# Patient Record
Sex: FEMALE | Race: WHITE | NOT HISPANIC OR LATINO | Employment: FULL TIME | URBAN - METROPOLITAN AREA
[De-identification: names, ages, dates, MRNs, and addresses within clinical notes are randomized per-mention and may not be internally consistent; named-entity substitution may affect disease eponyms.]

---

## 2018-05-03 DIAGNOSIS — M54.12 CERVICAL RADICULOPATHY: Primary | ICD-10-CM

## 2018-06-08 ENCOUNTER — CLINICAL SUPPORT (OUTPATIENT)
Dept: REHABILITATION | Facility: HOSPITAL | Age: 53
End: 2018-06-08
Attending: INTERNAL MEDICINE
Payer: MEDICAID

## 2018-06-08 DIAGNOSIS — Z74.09 DECREASED MOBILITY AND ENDURANCE: ICD-10-CM

## 2018-06-08 DIAGNOSIS — R53.1 DECREASED STRENGTH: ICD-10-CM

## 2018-06-08 PROCEDURE — 97110 THERAPEUTIC EXERCISES: CPT | Mod: PN

## 2018-06-08 PROCEDURE — 97161 PT EVAL LOW COMPLEX 20 MIN: CPT | Mod: PN

## 2018-06-08 NOTE — PLAN OF CARE
TIME RECORD    Date: 6/8/2018    Start Time:  7:15  Stop Time:  8:00    PROCEDURES:    TIMED  Procedure Min.   TE 10                     UNTIMED  Procedure Min.   IE 35         Total Timed Minutes:  10  Total Timed Units:  1 TE  Total Untimed Units:  1 LCE  Charges Billed/# of units:  1 TE + 1 LCE    [facility]    PHYSICAL THERAPY EVALUATION  Onset Date: 2016  Medical Diagnosis: M54.12 (ICD-10-CM) - Cervical radiculopathy  Treatment Diagnosis:   1. Decreased strength     2. Decreased mobility and endurance       Physician: Bryn Martinez,*  Past Medical History:   Diagnosis Date    Allergy     Anxiety     Arthritis     Asthma     Diabetes mellitus     Hyperlipidemia      Precautions: psoriatic arthritis, asthma, HTN, DMII  Prior Therapy: no PT  Medications: Brianna Koroma has a current medication list which includes the following prescription(s): fluoxetine, fluticasone, gabapentin, insulin nph, lisinopril, metformin, methotrexate, morinda citrifolia fruit, multivitamin, simvastatin, and sulfasalazine.  Nutrition:  Obese  History of Present Illness: See subjective below  Prior Level of Function: Independent  Social History: on disability; was a [employer]  Place of Residence (Steps/Adaptations): [facility] without step to enter  Functional Deficits Leading to Referral/Nature of Injury: sleep, carrying/lifting  Patient Therapy Goals: To get out of pain and sleep better and go back to swimming    Subjective     Brianna Koroma states she has been having neck pain for the last 20 years and recently became worse since the last 3 years which is also when she stopped seeing a chiropractor. Pt has psoriatic arthritis that gives her multiple joint issues. Pt c/o of neck and shoulder stiffness, neck clicking, disturbed sleep, whole B UE arm numbness with throbbing, and burning pain around the neck. Pt was swimming for exercise that she felt benefited her, but had to stop due to neck and B  shoulder pain. She also says she has tennis elbow in R arm, that started about 2 months ago after a day of gardening. Pt unable to walk anymore due to B hip and L thigh pain from also the psoriatic arthritis. Pt used to be a  of 30 years, and unable to perform daily duties because of her joint pain. Humira has helped with a lot of her pain that used to mainly affect her neck and hips the most, and now she can do her daily activities on the medication. Pt has a history of TMJ surgery (incision is in mouth), and has issues TMJ history. Pt has been getting headaches the last couple of weeks that mainly affects the sides of her head and jaw.    Pain:  Location: arms, neck  and trunk  Description: Aching, Dull and Tight  Activities Which Increase Pain: Bending, Morning, Lifting and Getting out of bed/chair  Activities Which Decrease Pain: relaxation, pain medication, ice, heating pad and rest  Pain Scale: 3/10 at best 5/10 now  10/10 at worst    Objective     Posture: forward head, anteriorly rolled shoulders  Palpation: +TTP at L cervical paraspinals, C6-T2 spinous process  Sensation: light touch intact  DTRs: NT  Range of Motion/Strength:   * = neck pain with testing  AROM: CERVICAL   Flexion 60   Extension 60   Right side bending 38   Left side bending 35   Right rotation 61    Left rotation 54     Shoulder  Right   Left  Pain/Dysfunction with Movement    AROM PROM MMT AROM PROM MMT    flexion WFL WFL 4+/5 WFL WFL 4+/5    extension WFL WFL 5/5 WFL WFL 5/5    abduction WFL WFL 4+/5 WFL WFL 4/5    adduction WFL WFL 5/5 WFL WFL 5/5    Internal rotation WFL WFL 5/5 WFL WFL 4+/5    ER at 0° abd WFL WFL 4/5 WFL WFL 4+/5      Flexibility: WNL  Gait: Without AD  Analysis: WFL  Bed Mobility:Independent  Transfers: Independent  Special Tests:   Sharp Rashmi = NT  Alar Ligament = NT  Spurling's test = negative B  Maximal Cervical Compression Test = NT  Cervical Distraction Test = negative  Deep Neck Flexor Endurance Test  (Longissimus capitis and colli strength test) = impaired neck flexor strength  Cervical Side-glides assessment = minimal opening restriction on Right C4-C5     CMS Impairment/Limitation/Restriction for FOTO Cervical Spine Survey    Therapist reviewed FOTO scores for Brianna Koroma on 6/8/2018.   FOTO documents entered into [facility] - see Media section.    Limitation Score: 54%  Category: Mobility  Predicted: 45%       TREATMENT     Time In: 7:50  Time Out: 8:00    PT Evaluation Completed? Yes  Discussed Plan of Care with patient: Yes    Brianna received 10 minutes of therapeutic exercise & instruction including:  DATE 6/8/2018   VISIT 1   POC 8/8/18    FOTO 1/5   MHP    MT    Stretches:    UT stretch    Levator scap stretch    Supine:    Chin tuck 5x   Chin tuck with iso c/s extension    DNF    Cervical rotations    Scapular retractions 5x5''   Dowel flexion    Serratus punches    Thoracic rotations    SL abd    SL flexion    SL gator        Prone:    Quadruped chin tuck    Chin tuck holds    Rows    I's/T's/Y's    6 Pack back        Standing:    Rows    Lat pull downs    Brueggers    Horizontal abd    Clocks    Wall slides 5x       CP    INITIALS TRU     Written Home Exercises Provided: Renee  Brianna demo good understanding of the education provided. Patient demo good return demo of skill of exercises.    See EMR in Patient Instructions for HEP given: Yes      Assessment       Initial Assessment (Pertinent finding, problem list and factors affecting outcome):   Pt is a 53 yo female dx with Cervical radiculopathy presenting to PT at [facility]. Pt currently presents with BUE and neck pain, decreased cervical ROM, decreased BUE and neck intrinsic muscle strength, poor posture, impaired, and functional deficits with carrying and lifting activities. Pt would benefit from skilled PT consisting of muscular skeletal stretching/strengthening, manual therapy, neuro muscular re-education, and  modalities prn to address limitations and increase functional mobility.      History  Co-morbidities and personal factors that may impact the plan of care Co-morbidities:   anxiety, psoriatic arthritis, astham, back pain, BMI over 30, depression, headaches, DMII, GI disease, sleep dysfunction      Personal Factors:   no deficits     high   Examination  Body Structures and Functions, activity limitations and participation restrictions that may impact the plan of care Body Regions:   head  neck  back  upper extremities  trunk    Body Systems:    gross symmetry  ROM  strength  gross coordinated movement  balance  gait  transfers  transitions  motor control  a    Participation Restrictions:   Carrying and lifting activities    Activity limitations:   Learning and applying knowledge  no deficits    General Tasks and Commands  no deficits    Communication  no deficits    Mobility  lifting and carrying objects    Self care  caring for body parts (brushing teeth, shaving, grooming)    Domestic Life  shopping  cooking    Interactions/Relationships  no deficits    Life Areas  no deficits    Community and Social Life  no deficits         high   Clinical Presentation stable and uncomplicated low   Decision Making/ Complexity Score: low       Rehab Potiential: excellent  Spiritual/Cultural Needs: None  Barriers to Rehab: psoriatic arthritis  GOALS: Short Term Goals = Long Term goals  1. Pt will demo good deep neck flexor strength to improve cervical lordosis and stabilization.  2. Pt will demo good sitting and standing posture due to psoriatic arthritis symptoms for improved spine health and decreased neck pain.  3. Pt to tolerate HEP to improve ROM and independence with ADL's  4. Report decreased neck pain </=2/10 to increase tolerance for ADLs and return to gardening.  5. Increase cervical AROM to WFL with min neck pain for increased functional mobility.  6. Increased strength in B shoulders/scapular stabilizers to >/= 5/5 MMT  grade to increase tolerance for ADL and work activities.  7. Pt will report at </= 45% on FOTO neck score for neck pain disability to demonstrate decrease in disability and improvement in neck pain.      Plan     Certification Period: 6/8/18 to 8/7/18  Recommended Treatment Plan: 2 times per week for 8 weeks: Cervical/Lumbar Traction, Electrical Stimulation IFC/Russian, Gait Training, Manual Therapy, Moist Heat/ Ice, Neuromuscular Re-ed, Patient Education, Self Care, Therapeutic Activites and Therapeutic Exercise  Other Recommendations: modalities prn, ASTYM prn, kinesiotape prn, Functional Dry Needling prn       Mk Mas, PT  6/8/2018      I CERTIFY THE NEED FOR THESE SERVICES FURNISHED UNDER THIS PLAN OF TREATMENT AND WHILE UNDER MY CARE    Physician's comments: ________________________________________________________________________________________________________________________________________________      Physician's Name: ___________________________________

## 2018-07-03 ENCOUNTER — CLINICAL SUPPORT (OUTPATIENT)
Dept: REHABILITATION | Facility: HOSPITAL | Age: 53
End: 2018-07-03
Attending: INTERNAL MEDICINE
Payer: MEDICAID

## 2018-07-03 DIAGNOSIS — R53.1 DECREASED STRENGTH: ICD-10-CM

## 2018-07-03 DIAGNOSIS — Z74.09 DECREASED MOBILITY AND ENDURANCE: ICD-10-CM

## 2018-07-03 PROCEDURE — 97110 THERAPEUTIC EXERCISES: CPT | Mod: PN

## 2018-07-03 NOTE — PROGRESS NOTES
DAILY TREATMENT NOTE    DATE: 7/3/2018    Start Time:  10:05  Stop Time:  11:00    PROCEDURES:    TIMED  Procedure Min.   TE 45   MT 10                 UNTIMED  Procedure Min.             Total Timed Minutes:  55  Total Timed Units:  4  Total Untimed Units:  0  Charges Billed/# of units:  3 TE + 1 MT      Progress/Current Status    Subjective:     Patient ID: Brianna Koroma is a 52 y.o. female.  Diagnosis:   1. Decreased strength     2. Decreased mobility and endurance       Pain: 0 /10  Pt reports she has not been doing much recently, but had been doing HEP and her neck has bene feeling better.    Objective:     Pt performed TE x 45 mins per log below, and received MT x 10 mins consisting of manual cervical distraction, gentle suboccipital release, and B UT stretch.    DATE 7/3/18 6/8/2018   VISIT 2/16 1   POC 8/8/18      FOTO 2/5 1/5   MHP      MT 10'     Stretches:      UT stretch MT     Levator scap stretch 2x30'' B     Supine:      Chin tuck  5x   Chin tuck with iso c/s extension 2x10  5'' holds     DNF 10x5'' w/ pillow     Cervical rotations 5'x5 B     Scapular retractions HEP 5x5''   Dowel flexion      Serratus punches 2x10 2# dowel  5' holds     Thoracic rotations      SL abd 2x15 1# B     SL flexion      SL gator 2x15 B     SL ER 2x10 1# B     Prone:      Quadruped chin tuck      Chin tuck holds      Rows      I's/T's/Y's      6 Pack back      Scapular retractions w/ head retraction      Standing:      Rows 2x15 GTC     Lat pull downs Next      Brueggers      Horizontal abd      Clocks      Wall slides 15x 5x          CP      INITIALS TRU TRU       Assessment:     Pt tolerated all TE well without any pain. Pt did demo trembling throughout most TE though able to complete all reps. Cont gentle MT due to psoriatic arthritis.     Patient Education/Response:     Cont HEP    Plans and Goals:     Cont per POC, progress per pt tolerance.    GOALS: Short Term Goals = Long Term goals  1. Pt will demo good deep  neck flexor strength to improve cervical lordosis and stabilization.  2. Pt will demo good sitting and standing posture due to psoriatic arthritis symptoms for improved spine health and decreased neck pain.  3. Pt to tolerate HEP to improve ROM and independence with ADL's  4. Report decreased neck pain </=2/10 to increase tolerance for ADLs and return to gardening.  5. Increase cervical AROM to WFL with min neck pain for increased functional mobility.  6. Increased strength in B shoulders/scapular stabilizers to >/= 5/5 MMT grade to increase tolerance for ADL and work activities.  7. Pt will report at </= 45% on FOTO neck score for neck pain disability to demonstrate decrease in disability and improvement in neck pain.

## 2018-07-10 ENCOUNTER — TELEPHONE (OUTPATIENT)
Dept: REHABILITATION | Facility: HOSPITAL | Age: 53
End: 2018-07-10

## 2018-08-16 ENCOUNTER — CLINICAL SUPPORT (OUTPATIENT)
Dept: REHABILITATION | Facility: HOSPITAL | Age: 53
End: 2018-08-16
Attending: INTERNAL MEDICINE
Payer: MEDICAID

## 2018-08-16 DIAGNOSIS — Z74.09 DECREASED MOBILITY AND ENDURANCE: ICD-10-CM

## 2018-08-16 DIAGNOSIS — R53.1 DECREASED STRENGTH: ICD-10-CM

## 2018-08-16 PROCEDURE — 97110 THERAPEUTIC EXERCISES: CPT | Mod: PN

## 2018-08-16 NOTE — PROGRESS NOTES
"DAILY TREATMENT NOTE    DATE: 8/16/2018    Start Time:  800  Stop Time:  845    PROCEDURES:    TIMED  Procedure Min.   Manual therapy 10   Therex 25   Therex supervised 10       Total Timed Minutes:  35  Total Timed Units:  2  Total Untimed Units:  0  Charges Billed/# of units:  2 TE      Progress/Current Status    Subjective:     Patient ID: Brianna Koroma is a 52 y.o. female.  Diagnosis:   1. Decreased strength     2. Decreased mobility and endurance         "Its bothering me a little," states doing HEP    Objective:     Manual therapy provided x 10 including STM with elongation to B Cervical paraspinals, gentle sub occipital release, manual stretch to B UT with STM/MFR to same, B upper thoracic release.  Pt performed TE x 25 mins with 1:1 by PTA, additional 10 minutes with supervision all as per log below.    DATE 8/16/18 7/3/18 6/8/2018   VISIT 3/16 2/16 1   POC 8/8/18       FOTO 3/5 2/5 1/5   MHP       MT 12' 10'     Stretches:       UT stretch MT/self next MT     Levator scap stretch MT/self 30"x2 B 2x30'' B     Supine:       Chin tuck w/ext  5x   Chin tuck with iso c/s extension 2x10  5'' holds 2x10  5'' holds     DNF 5"x10 w/pillow 10x5'' w/ pillow     Cervical rotations 5"x5  5'x5 B     Scapular retractions HEP HEP 5x5''   Dowel flexion       Serratus punches 2# dowel  5" 2x10  2x10 2# dowel  5' holds     Thoracic rotations       SL abd 2x15 1# B 2x15 1# B     SL flexion       SL gator 2x15 B 2x15 B     SL ER 1# 2x15 B 2x10 1# B     Prone:       Quadruped chin tuck       Chin tuck holds       Rows       I's/T's/Y's       6 Pack back       Scapular retractions w/ head retraction       Standing:       Rows 2x15 GTC 2x15 GTC     Lat pull downs 2x15 GTC Next      Brueggers       Horizontal abd       Clocks       Wall slides 2x10 15x 5x           CP       INITIALS DH 1/6 TRU TRU       Assessment:     Moderate myofascial restrictions noted with MT to left cerv paraspinals and UT, able to tolerate same and " "all therex with reports of "I feel good" after treatment.    Patient Education/Response:     Patient educated to continue HEP.  Verbalized understanding.    Plans and Goals:     Cont per POC, progress per pt tolerance.    GOALS: Short Term Goals = Long Term goals  1. Pt will demo good deep neck flexor strength to improve cervical lordosis and stabilization.  2. Pt will demo good sitting and standing posture due to psoriatic arthritis symptoms for improved spine health and decreased neck pain.  3. Pt to tolerate HEP to improve ROM and independence with ADL's  4. Report decreased neck pain </=2/10 to increase tolerance for ADLs and return to gardening.  5. Increase cervical AROM to WFL with min neck pain for increased functional mobility.  6. Increased strength in B shoulders/scapular stabilizers to >/= 5/5 MMT grade to increase tolerance for ADL and work activities.  7. Pt will report at </= 45% on FOTO neck score for neck pain disability to demonstrate decrease in disability and improvement in neck pain.     "

## 2018-08-21 ENCOUNTER — CLINICAL SUPPORT (OUTPATIENT)
Dept: REHABILITATION | Facility: HOSPITAL | Age: 53
End: 2018-08-21
Attending: INTERNAL MEDICINE
Payer: MEDICAID

## 2018-08-21 DIAGNOSIS — R53.1 DECREASED STRENGTH: ICD-10-CM

## 2018-08-21 DIAGNOSIS — Z74.09 DECREASED MOBILITY AND ENDURANCE: ICD-10-CM

## 2018-08-21 PROCEDURE — 97110 THERAPEUTIC EXERCISES: CPT | Mod: PN

## 2018-08-21 NOTE — PROGRESS NOTES
"DAILY TREATMENT NOTE    DATE: 8/21/2018    Start Time:  805  Stop Time:  900    PROCEDURES:    TIMED  Procedure Min.   Manual therapy 15   Therex 40   Therex supervised        Total Timed Minutes:  55  Total Timed Units:  4  Total Untimed Units:  0  Charges Billed/# of units:  4 TE      Progress/Current Status    Subjective:     Patient ID: Brianna Koroma is a 52 y.o. female.  Diagnosis:   1. Decreased strength     2. Decreased mobility and endurance       Pt reports she started taking muscle relaxers about 10 days ago that has helped with the BUE numbness at night and L sided neck pain. Pt reports she was on vacation the past couple of weeks and has been doing HEP throughout. She states this is the best she felt in years since she started taking the muscle relaxers.    Objective:   O: cervical PROM = decreased L cervical sidebending, neck pain midway throughout R sidebending, slightly limited R cervical rotation, and decreased extension  Manual therapy provided x 15 mins consisting of SBO (2 bouts), intermittent upper and mid cervical traction manually (2 bouts), cervical sideglides grade III, and cervical PROM. Pt then perform TE x 40 mins per log below.  FOTO done today due to length of time away from therapy and UPOC.    DATE 8/21/18 8/16/18 7/3/18 6/8/2018   VISIT 4/16 3/16 2/16 1   POC 8/8/18        FOTO 4/5 3/5 2/5 1/5   MHP        MT 15' 12' 10'     Stretches:        UT stretch MT MT/self next MT     Levator scap stretch NT MT/self 30"x2 B 2x30'' B     Supine:        Chin tuck -- w/ext  5x   Chin tuck with iso c/s extension 2x10 5'' holds 2x10  5'' holds 2x10  5'' holds     DNF 10x10''  No pillow 5"x10 w/pillow 10x5'' w/ pillow     Cervical rotations NT 5"x5  5'x5 B     Scapular retractions -- HEP HEP 5x5''   Dowel flexion        Serratus punches 3# dowel  5'' 2x10  2# dowel  5" 2x10  2x10 2# dowel  5' holds     Thoracic rotations        SL abd 2x15 1# B 2x15 1# B 2x15 1# B     SL flexion        SL " gator 2x10 B 1# 2x15 B 2x15 B     SL ER NT 1# 2x15 B 2x10 1# B     Prone:        Quadruped chin tuck        Chin tuck holds        Rows Next        I's/T's/Y's Next        6 Pack back        Scapular retractions w/ head retraction        Standing:        Rows NT 2x15 GTC 2x15 GTC     Lat pull downs 2x15 GTC 2x15 GTC Next      Brueggers Next        Horizontal abd 2x10 supine  RTB  next on wall       Clocks        Wall slides 2x10 w/ shrug 2x10 15x 5x            CP        INITIALS TRU DH 1/6 TRU TRU     Range of Motion/Strength:   * = neck pain with testing  AROM: CERVICAL   Flexion 65   Extension 51   Right side bending 40   Left side bending 35   Right rotation 56    Left rotation 45      Shoulder   Right     Left   Pain/Dysfunction with Movement     AROM PROM MMT AROM PROM MMT     flexion WFL WFL 4+/5 WFL WFL 4+/5     extension WFL WFL 5/5 WFL WFL 5/5     abduction WFL WFL 4/5 WFL WFL 4/5     adduction WFL WFL 5/5 WFL WFL 5/5     Internal rotation WFL WFL 5/5 WFL WFL 5/5     ER at 0° abd WFL WFL 4+/5 WFL WFL 4+/5     Cervical Side-glides assessment = minimal opening restriction on Right C4-C5     Assessment:     Pt reports no headaches or pain after the session. Pt wondering if she still needs to come in since muscle relaxers are helping, pt educated that goal is to get same releif without taking regualr medication and fixed original issue. Pt completed FOTO today.  Pt requested updated HEP next visit.    Patient Education/Response:     Patient educated to continue HEP.  Verbalized understanding.    Plans and Goals:     Cont per POC, progress per pt tolerance.    GOALS: Short Term Goals = Long Term goals  1. Pt will demo good deep neck flexor strength to improve cervical lordosis and stabilization.  2. Pt will demo good sitting and standing posture due to psoriatic arthritis symptoms for improved spine health and decreased neck pain.  3. Pt to tolerate HEP to improve ROM and independence with ADL's  4. Report  decreased neck pain </=2/10 to increase tolerance for ADLs and return to gardening.  5. Increase cervical AROM to WFL with min neck pain for increased functional mobility.  6. Increased strength in B shoulders/scapular stabilizers to >/= 5/5 MMT grade to increase tolerance for ADL and work activities.  7. Pt will report at </= 45% on FOTO neck score for neck pain disability to demonstrate decrease in disability and improvement in neck pain.

## 2018-08-21 NOTE — PLAN OF CARE
Date: 8/21/2018      PHYSICAL THERAPY UPDATED PLAN OF TREATMENT    Patient name: Brianna Koroma  Onset Date:  2016  SOC Date:  6/8/18  Primary Diagnosis: M54.12 (ICD-10-CM) - Cervical radiculopathy  Treatment Diagnosis:    1. Decreased strength     2. Decreased mobility and endurance       Referring Physician: Bryn Martinez,*  Certification Period:  8/21/18 to 10/21/18  Precautions:  Standard  Visits from SOC:  4  Functional Level Prior to SOC:     Range of Motion/Strength:   * = neck pain with testing  AROM: CERVICAL   Flexion 60   Extension 60   Right side bending 38   Left side bending 35   Right rotation 61    Left rotation 54      Shoulder   Right     Left   Pain/Dysfunction with Movement     AROM PROM MMT AROM PROM MMT     flexion WFL WFL 4+/5 WFL WFL 4+/5     extension WFL WFL 5/5 WFL WFL 5/5     abduction WFL WFL 4+/5 WFL WFL 4/5     adduction WFL WFL 5/5 WFL WFL 5/5     Internal rotation WFL WFL 5/5 WFL WFL 4+/5     ER at 0° abd WFL WFL 4/5 WFL WFL 4+/5     Cervical Side-glides assessment = minimal opening restriction on Right C4-C5   Updated Subjective: Pt reports she started taking muscle relaxers about 10 days ago that has helped with the BUE numbness at night and L sided neck pain. Pt reports she was on vacation the past couple of weeks and has been doing HEP throughout. She states this is the best she felt in years since she started taking the muscle relaxers.    Updated Objective:   Range of Motion/Strength:   * = neck pain with testing  AROM: CERVICAL   Flexion 65   Extension 51   Right side bending 40   Left side bending 35   Right rotation 56    Left rotation 45      Shoulder   Right     Left   Pain/Dysfunction with Movement     AROM PROM MMT AROM PROM MMT     flexion WFL WFL 4+/5 WFL WFL 4+/5     extension WFL WFL 5/5 WFL WFL 5/5     abduction WFL WFL 4/5 WFL WFL 4/5     adduction WFL WFL 5/5 WFL WFL 5/5     Internal rotation WFL WFL 5/5 WFL WFL 5/5     ER at 0° abd WFL WFL 4+/5 WFL  WFL 4+/5     Cervical Side-glides assessment = minimal opening restriction on Right C4-C5     Updated Assessment:  Pt tolerated today's session well with full relief of headache and neck pain. Pt is still limited in cervicla mobility and neck/UE strength and would benefit from skilled PT to improved pain, headaches, and decrease medication.  Previous Short/Long Term Goals Status:  GOALS: Short Term Goals = Long Term goals  1. Pt will demo good deep neck flexor strength to improve cervical lordosis and stabilization.  2. Pt will demo good sitting and standing posture due to psoriatic arthritis symptoms for improved spine health and decreased neck pain.  3. Pt to tolerate HEP to improve ROM and independence with ADL's  4. Report decreased neck pain </=2/10 to increase tolerance for ADLs and return to gardening.  5. Increase cervical AROM to WFL with min neck pain for increased functional mobility.  6. Increased strength in B shoulders/scapular stabilizers to >/= 5/5 MMT grade to increase tolerance for ADL and work activities.  7. Pt will report at </= 45% on FOTO neck score for neck pain disability to demonstrate decrease in disability and improvement in neck pain.    Long Term Goal Status:   continue per initial plan of care.  Reasons for Recertification of Therapy:   [facility]    Certification Period: 8/21/18 to 10/21/18  Recommended Treatment Plan: 2 times per week for 8 weeks: Aquatic Therapy, Cervical/Lumbar Traction, Electrical Stimulation IFC/Russian, Gait Training, Manual Therapy, Moist Heat/ Ice, Neuromuscular Re-ed, Orthotic Management and Training, Patient Education, Self Care, Therapeutic Activites and Therapeutic Exercise  Other Recommendations: JANETH Mas, PT  8/21/2018      I CERTIFY THE NEED FOR THESE SERVICES FURNISHED UNDER THIS PLAN OF TREATMENT AND WHILE UNDER MY CARE    Physician's comments:  ________________________________________________________________________________________________________________________________________________      Physician's Name: ___________________________________

## 2018-08-23 ENCOUNTER — CLINICAL SUPPORT (OUTPATIENT)
Dept: REHABILITATION | Facility: HOSPITAL | Age: 53
End: 2018-08-23
Attending: INTERNAL MEDICINE
Payer: MEDICAID

## 2018-08-23 DIAGNOSIS — Z74.09 DECREASED MOBILITY AND ENDURANCE: ICD-10-CM

## 2018-08-23 DIAGNOSIS — R53.1 DECREASED STRENGTH: ICD-10-CM

## 2018-08-23 PROCEDURE — 97110 THERAPEUTIC EXERCISES: CPT | Mod: PN

## 2018-08-23 NOTE — PROGRESS NOTES
"DAILY TREATMENT NOTE    DATE: 8/23/2018    Start Time:  815  Stop Time:  900    PROCEDURES:    TIMED  Procedure Min.   Therex 25   Therex supervised 20           Total Timed Minutes:  25  Total Timed Units:  2  Total Untimed Units:  0  Charges Billed/# of units:  2 TE      Progress/Current Status    Subjective:     Patient ID: Brianna Koroma is a 52 y.o. female.  Diagnosis:   1. Decreased strength     2. Decreased mobility and endurance       Patient states no pain today.    Objective:   [facility] arrived  then perform TE x  mins per log below.      DATE 8/23/18 8/21/18 8/16/18 7/3/18 6/8/2018   VISIT 5/16 4/16 3/16 2/16 1   POC 8/8/18         FOTO 5/5 4/5 DONE 3/5 2/5 1/5   MHP         MT NT 15' 12' 10'     Stretches:         UT stretch  MT MT/self next MT     Levator scap stretch  NT MT/self 30"x2 B 2x30'' B     Supine:         Chin tuck  -- w/ext  5x   Chin tuck with iso c/s extension 5" hold  2x10 2x10 5'' holds 2x10  5'' holds 2x10  5'' holds     DNF 10"x10  No pillow 10x10''  No pillow 5"x10 w/pillow 10x5'' w/ pillow     Cervical rotations  NT 5"x5  5'x5 B     Scapular retractions  -- HEP HEP 5x5''   Dowel flexion         Serratus punches 3# dowel  5" 2x12 3# dowel  5'' 2x10  2# dowel  5" 2x10  2x10 2# dowel  5' holds     Thoracic rotations         SL abd 2# 2x10 B 2x15 1# B 2x15 1# B 2x15 1# B     SL flexion         SL gator 1$ 2x15 B 2x10 B 1# 2x15 B 2x15 B     SL ER 2# 2x10 B NT 1# 2x15 B 2x10 1# B     Prone:         Quadruped chin tuck         Chin tuck holds         Rows 2x10 B Next        I's/T's/Y's Next Next        6 Pack back         Scapular retractions w/ head retraction         Standing:         Rows 2x15 GTB NT 2x15 GTC 2x15 GTC     Lat pull downs 2x15 GTB 2x15 GTC 2x15 GTC Next      Brueggers RTB 2x10  Against wall Next        Horizontal abd RTB stand   2x10  Against wall 2x10 supine  RTB  next on wall       Clocks         Wall slides 2x10 w/shrug 2x10 w/ shrug 2x10 15x 5x             CP       "   INITIALS  1/6 [facility] 1/6 [facility]       Assessment:     Patient able to increase activity despite time limitations. Manual therapy held due to pain free and time constraints.    Patient Education/Response:     Patient educated to continue HEP.  Verbalized understanding.    Plans and Goals:     Cont per POC, progress per pt tolerance.    GOALS: Short Term Goals = Long Term goals  1. Pt will demo good deep neck flexor strength to improve cervical lordosis and stabilization.  2. Pt will demo good sitting and standing posture due to psoriatic arthritis symptoms for improved spine health and decreased neck pain.  3. Pt to tolerate HEP to improve ROM and independence with ADL's  4. Report decreased neck pain </=2/10 to increase tolerance for ADLs and return to gardening.  5. Increase cervical AROM to WFL with min neck pain for increased functional mobility.  6. Increased strength in B shoulders/scapular stabilizers to >/= 5/5 MMT grade to increase tolerance for ADL and work activities.  7. Pt will report at </= 45% on FOTO neck score for neck pain disability to

## 2018-08-28 ENCOUNTER — CLINICAL SUPPORT (OUTPATIENT)
Dept: REHABILITATION | Facility: HOSPITAL | Age: 53
End: 2018-08-28
Attending: INTERNAL MEDICINE
Payer: MEDICAID

## 2018-08-28 DIAGNOSIS — Z74.09 DECREASED MOBILITY AND ENDURANCE: ICD-10-CM

## 2018-08-28 DIAGNOSIS — R53.1 DECREASED STRENGTH: ICD-10-CM

## 2018-08-28 PROCEDURE — 97110 THERAPEUTIC EXERCISES: CPT | Mod: PN

## 2018-08-28 NOTE — PROGRESS NOTES
"DAILY TREATMENT NOTE    DATE: 8/28/2018    Start Time:  8:05 AM  Stop Time: 8:50 AM    PROCEDURES:    TIMED  Procedure Min.   Therex 45               Total Timed Minutes:  45  Total Timed Units:  3  Total Untimed Units:  0  Charges Billed/# of units:  3TE      Progress/Current Status    Subjective:     Patient ID: Brianna Koroma is a 52 y.o. female.  Diagnosis:   1. Decreased strength     2. Decreased mobility and endurance       Patient states she has noticed a "lump" in her Left lateral cervical / neck. She states, " It feels tight". Pt agreeable to PT session.     Objective:   Patient completed all therex as per logged below 1:1 /w PTA     DATE 8/28/18 8/23/18 8/21/18 8/16/18 7/3/18 6/8/2018   VISIT 6/16 5/16 4/16 3/16 2/16 1   POC 8/8/18          FOTO 6/10 5/5 4/5 DONE 3/5 2/5 1/5   MHP          MT NT NT 15' 12' 10'     Stretches:          UT stretch   MT MT/self next MT     Levator scap stretch   NT MT/self 30"x2 B 2x30'' B     Supine:          Chin tuck   -- w/ext  5x   Chin tuck with iso c/s extension 5" hold x 20 5" hold  2x10 2x10 5'' holds 2x10  5'' holds 2x10  5'' holds     DNF 10"x10  10"x10  No pillow 10x10''  No pillow 5"x10 w/pillow 10x5'' w/ pillow     Cervical rotations   NT 5"x5  5'x5 B     Scapular retractions   -- HEP HEP 5x5''   Dowel flexion          Serratus punches 3# dowel  5" 2x12 3# dowel  5" 2x12 3# dowel  5'' 2x10  2# dowel  5" 2x10  2x10 2# dowel  5' holds     Thoracic rotations          SL abd 2# 2x10 B 2# 2x10 B 2x15 1# B 2x15 1# B 2x15 1# B     SL flexion          SL gator 2x10 B 1# 1$ 2x15 B 2x10 B 1# 2x15 B 2x15 B     SL ER 2# 2x10 B 2# 2x10 B NT 1# 2x15 B 2x10 1# B     Prone:          Quadruped chin tuck          Chin tuck holds          Rows 2x10 B 2x10 B Next        I's/T's/Y's 2x10 B Next Next        6 Pack back          Scapular retractions w/ head retraction          Standing:          Rows 2x15 GTB 2x15 GTB NT 2x15 GTC 2x15 GTC     Lat pull downs 2x15 GTB 2x15 GTB 2x15 " GTC 2x15 GTC Next      Brueggers RTB 2x10  Against wall RTB 2x10  Against wall Next        Horizontal abd NT RTB stand   2x10  Against wall 2x10 supine  RTB  next on wall       Clocks          Wall slides 2x10 w/shrug 2x10 w/shrug 2x10 w/ shrug 2x10 15x 5x              CP          INITIALS JA 2/6  1/6 [facility] 1/6 [facility]       Assessment:     Pt able to complete today's session with no reports of increased pain in cervical spine. Performed additional prone therex with no adverse reactions.     Patient Education/Response:     Patient educated to continue HEP.  Verbalized understanding.    Plans and Goals:     Cont per POC, progress per pt tolerance.    GOALS: Short Term Goals = Long Term goals  1. Pt will demo good deep neck flexor strength to improve cervical lordosis and stabilization.  2. Pt will demo good sitting and standing posture due to psoriatic arthritis symptoms for improved spine health and decreased neck pain.  3. Pt to tolerate HEP to improve ROM and independence with ADL's  4. Report decreased neck pain </=2/10 to increase tolerance for ADLs and return to gardening.  5. Increase cervical AROM to WFL with min neck pain for increased functional mobility.  6. Increased strength in B shoulders/scapular stabilizers to >/= 5/5 MMT grade to increase tolerance for ADL and work activities.  7. Pt will report at </= 45% on FOTO neck score for neck pain disability to

## 2018-08-30 ENCOUNTER — CLINICAL SUPPORT (OUTPATIENT)
Dept: REHABILITATION | Facility: HOSPITAL | Age: 53
End: 2018-08-30
Attending: INTERNAL MEDICINE
Payer: MEDICAID

## 2018-08-30 DIAGNOSIS — Z74.09 DECREASED MOBILITY AND ENDURANCE: ICD-10-CM

## 2018-08-30 DIAGNOSIS — R53.1 DECREASED STRENGTH: ICD-10-CM

## 2018-08-30 PROCEDURE — 97110 THERAPEUTIC EXERCISES: CPT | Mod: PN

## 2018-08-30 NOTE — PROGRESS NOTES
"  Physical Therapy Daily Treatment Note     Name: Brianna Koroma  Clinic Number: [id number]    Therapy Diagnosis:   Encounter Diagnoses   Name Primary?    Decreased strength     Decreased mobility and endurance      Physician: Bryn Martinez,*    Visit Date: 8/30/2018  Physician Orders: PT Eval and Treat  Medical Diagnosis: Radiculopathy, cervical region  Evaluation Date: 6/8/18  Authorization Period Expiration: 10/16/2018  Plan of Care Certification Period: 8/21/18 to 10/21/18  Visit #/Visits authorized: 5/ 16     Time In: 0804  Time Out: 0848  Total Billable Time: 44 minutes (TE-3)    Precautions: psoriatic arthritis, asthma, HTN, DMII    Subjective     Pt reports: no new complaints.  Response to previous treatment: no adverse reactions  Functional change: no change    Pain: 3/10  Location: bilateral neck      Objective     Brianna received therapeutic exercises to develop strength, endurance, ROM, flexibility and posture for 44 minutes including:  Log below    DATE 8/30/18 8/28/18   VISIT 7/16 6/16   POC 8/8/18     FOTO 7/10 6/10   MHP     MT -- NT   Stretches:     UT stretch --    Levator scap stretch --    Supine:     Chin tuck --    Chin tuck with iso c/s extension 5" hold x 20 5" hold x 20   DNF 10"x10  10"x10    Cervical rotations     Scapular retractions     Dowel flexion     Serratus punches 3# dowel  5" 2x15 3# dowel  5" 2x12   Thoracic rotations     SL abd 2# 3x10 B 2# 2x10 B   SL flexion     SL gator 1# 3x10 B 2x10 B 1#   SL ER 2# 3x10B 2# 2x10 B   Prone:     Quadruped chin tuck     Chin tuck holds     Rows 2x12 B 2x10 B   I's/T's/Y's 2x12 B 2x10 B   6 Pack back     Scapular retractions w/ head retraction     Standing:     Rows 3x10 BTC 2x15 GTB   Lat pull downs 3x10 BTC 2x15 GTB   Brueggers RTB 3x10  Against wall RTB 2x10  Against wall   Horizontal abd RTB 3x10  Against wall NT   Clocks     Wall slides 3x10 w/shrug 2x10 w/shrug         CP     INITIALS KV JA 2/6           Home Exercises " Provided and Patient Education Provided     Written Home Exercises Provided: Patient instructed to cont prior HEP.  Exercises were reviewed and Brianna was able to demonstrate them prior to the end of the session.  Brianna demonstrated good  understanding of the education provided.     See EMR under Patient Instructions for exercises provided prior visit.    Assessment     Pt tolerated progression of exercises well with no c/o pain or discomfort.    Brianna is progressing well towards her goals.   Pt prognosis is Good.     Pt will continue to benefit from skilled outpatient physical therapy to address the deficits listed in the problem list box on initial evaluation, provide pt/family education and to maximize pt's level of independence in the home and community environment.     Pt's spiritual, cultural and educational needs considered and pt agreeable to plan of care and goals.    Anticipated barriers to physical therapy: none    Goals:     Short Term Goals = Long Term goals  1. Pt will demo good deep neck flexor strength to improve cervical lordosis and stabilization.  2. Pt will demo good sitting and standing posture due to psoriatic arthritis symptoms for improved spine health and decreased neck pain.  3. Pt to tolerate HEP to improve ROM and independence with ADL's  4. Report decreased neck pain </=2/10 to increase tolerance for ADLs and return to gardening.  5. Increase cervical AROM to WFL with min neck pain for increased functional mobility.  6. Increased strength in B shoulders/scapular stabilizers to >/= 5/5 MMT grade to increase tolerance for ADL and work activities.  7. Pt will report at </= 45% on FOTO neck score for neck pain disability to         Plan     Cont POC.    Leslee Glass, PT

## 2018-09-04 ENCOUNTER — CLINICAL SUPPORT (OUTPATIENT)
Dept: REHABILITATION | Facility: HOSPITAL | Age: 53
End: 2018-09-04
Attending: INTERNAL MEDICINE
Payer: MEDICAID

## 2018-09-04 DIAGNOSIS — Z74.09 DECREASED MOBILITY AND ENDURANCE: ICD-10-CM

## 2018-09-04 DIAGNOSIS — R53.1 DECREASED STRENGTH: ICD-10-CM

## 2018-09-04 PROCEDURE — 97110 THERAPEUTIC EXERCISES: CPT | Mod: PN

## 2018-09-04 NOTE — PROGRESS NOTES
"    DAILY TREATMENT NOTE    DATE: 9/4/2018    Start Time:  800  Stop Time:  855    PROCEDURES:    TIMED  Procedure Min.   Therex 55               Total Timed Minutes:  55  Total Timed Units:  4  Total Untimed Units:  0  Charges Billed/# of units:  4 TE      Progress/Current Status    Subjective:     Patient ID: Brianna Koroma is a 52 y.o. female.  Diagnosis:   1. Decreased strength     2. Decreased mobility and endurance       Patient voiced no complaints.  States "ok".    Objective:     Brianna received therapeutic exercises to develop strength, endurance, ROM, flexibility and posture for 55 minutes with 1:1 by PTA including all therex as per log.  Increased reps and added thoracic rotations.    DATE 9/04/18 8/30/18 8/28/18   VISIT 8/16 7/16 6/16   POC 8/8/18      FOTO 8/10 7/10 6/10   MHP      MT -- -- NT   Stretches:      UT stretch -- --    Levator scap stretch -- --    Supine:      Chin tuck  --    Chin tuck with iso c/s extension 5"x20 5" hold x 20 5" hold x 20   DNF 10"x10 10"x10  10"x10    Cervical rotations --     Scapular retractions --     Dowel flexion      Serratus punches 3# dowel  5" 2x15 3# dowel  5" 2x15 3# dowel  5" 2x12   Thoracic rotations 2x10 B     SL abd 2# 2x15 B 2# 3x10 B 2# 2x10 B   SL flexion      SL gator 1# 2x15 B 1# 3x10 B 2x10 B 1#   SL ER 2# 3x10 B 2# 3x10B 2# 2x10 B   Prone:      Quadruped chin tuck --     Chin tuck holds --     Rows 2x15 B 2x12 B 2x10 B   I's/T's/Y's 2x12 B over GSB at mat 2x12 B 2x10 B   6 Pack back      Scapular retractions w/ head retraction      Standing:      Rows BTC 2x15 3x10 BTC 2x15 GTB   Lat pull downs BTC 2x15 3x10 BTC 2x15 GTB   Brueggers RTB 2x15  Against wall RTB 3x10  Against wall RTB 2x10  Against wall   Horizontal abd RTB 2x15  Against wall RTB 3x10  Against wall NT   Clocks      Wall slides 3x10 w/shrug 3x10 w/shrug 2x10 w/shrug          CP      INITIALS DH 1/6 KV JA 2/6       Assessment:     Patient able to increase activity with added reps and " "trial thoracic rotations, "open book" today.  States "good" after treatment.    Patient Education/Response:     Patient educated to continue HEP.  Verbalized understanding.    Plans and Goals:     Continue PT per POC, progress as able.    Short Term Goals = Long Term goals  1. Pt will demo good deep neck flexor strength to improve cervical lordosis and stabilization.  2. Pt will demo good sitting and standing posture due to psoriatic arthritis symptoms for improved spine health and decreased neck pain.  3. Pt to tolerate HEP to improve ROM and independence with ADL's  4. Report decreased neck pain </=2/10 to increase tolerance for ADLs and return to gardening.  5. Increase cervical AROM to WFL with min neck pain for increased functional mobility.  6. Increased strength in B shoulders/scapular stabilizers to >/= 5/5 MMT grade to increase tolerance for ADL and work activities.  7. Pt will report at </= 45% on FOTO neck score for neck pain disability to     "

## 2018-09-06 ENCOUNTER — CLINICAL SUPPORT (OUTPATIENT)
Dept: REHABILITATION | Facility: HOSPITAL | Age: 53
End: 2018-09-06
Attending: INTERNAL MEDICINE
Payer: MEDICAID

## 2018-09-06 DIAGNOSIS — Z74.09 DECREASED MOBILITY AND ENDURANCE: ICD-10-CM

## 2018-09-06 DIAGNOSIS — R53.1 DECREASED STRENGTH: ICD-10-CM

## 2018-09-06 PROCEDURE — 97110 THERAPEUTIC EXERCISES: CPT | Mod: PN

## 2018-09-06 NOTE — PROGRESS NOTES
"DAILY TREATMENT NOTE    DATE: 9/6/2018    Start Time:  805  Stop Time:  900    PROCEDURES:    TIMED  Procedure Min.   Therex supervised 10   Therex 40            Total Timed Minutes:  40   Total Timed Units:  3  Total Untimed Units:  0  Charges Billed/# of units:  3 TE      Progress/Current Status    Subjective:     Patient ID: Brianna Koroma is a 52 y.o. female.  Diagnosis:   1. Decreased strength     2. Decreased mobility and endurance       Patient states having less pain overall. Requesting written HEP "so I don't forget what to do."    Objective:     Brianna received therapeutic exercises to develop strength, endurance, ROM, flexibility and posture for 55 minutes with 1:1 by PTA including all therex as per log.  Increased reps and added thoracic rotations.    DATE 9/06/18 9/04/18 8/30/18 8/28/18   VISIT 9/16 8/16 7/16 6/16   POC 8/8/18       FOTO 9/10 NEXT 8/10 7/10 6/10   MHP --      MT -- -- -- NT   Stretches:       UT stretch -- -- --    Levator scap stretch -- -- --    Supine:       Chin tuck --  --    Chin tuck with iso c/s extension 5"x20 5"x20 5" hold x 20 5" hold x 20   DNF 10"x10 10"x10 10"x10  10"x10    Cervical rotations  --     Scapular retractions  --     Dowel flexion       Serratus punches 3# dowel  5" 2x15 3# dowel  5" 2x15 3# dowel  5" 2x15 3# dowel  5" 2x12   Thoracic rotations 2x12 B 2x10 B     SL abd 2# 2x15 B 2# 2x15 B 2# 3x10 B 2# 2x10 B   SL flexion       SL gator 2# 2x10 B 1# 2x15 B 1# 3x10 B 2x10 B 1#   SL ER 2# 2x15 B 2# 3x10 B 2# 3x10B 2# 2x10 B   Prone:       Quadruped chin tuck  --     Chin tuck holds  --     Rows 2x15 B 2x15 B 2x12 B 2x10 B   I's/T's/Y's 2x15 B over GSB at mat 2x12 B over GSB at mat 2x12 B 2x10 B   6 Pack back       Scapular retractions w/ head retraction       Standing:       Rows BTC 2x15 BTC 2x15 3x10 BTC 2x15 GTB   Lat pull downs BTC 2x15 BTC 2x15 3x10 BTC 2x15 GTB   Brueggers RTB 2x15  Against wall RTB 2x15  Against wall RTB 3x10  Against wall RTB " 2x10  Against wall   Horizontal abd RTB 2x15  Against wall RTB 2x15  Against wall RTB 3x10  Against wall NT   Clocks       Wall slides 2x15 w/shrug 3x10 w/shrug 3x10 w/shrug 2x10 w/shrug           CP       INITIALS DH 2/6 DH 1/6 KV JA 2/6       Assessment:     Patient able to again increase activity as noted with added reps and weight without complaint of pain or difficulty.      Patient Education/Response:     Patient educated to continue HEP.  Verbalized understanding.  Given written handout for HEP including chin tucks with extension, DNF, Serratus punch all in supine; Sidelying shldr abd, ER, horizontal abd (gator) and thoracic rotations (open book.)  Demonstrated understanding.  See copy in media.     Plans and Goals:     Continue PT per POC, progress as able.    Short Term Goals = Long Term goals  1. Pt will demo good deep neck flexor strength to improve cervical lordosis and stabilization.  2. Pt will demo good sitting and standing posture due to psoriatic arthritis symptoms for improved spine health and decreased neck pain.  3. Pt to tolerate HEP to improve ROM and independence with ADL's  4. Report decreased neck pain </=2/10 to increase tolerance for ADLs and return to gardening.  5. Increase cervical AROM to WFL with min neck pain for increased functional mobility.  6. Increased strength in B shoulders/scapular stabilizers to >/= 5/5 MMT grade to increase tolerance for ADL and work activities.  7. Pt will report at </= 45% on FOTO neck score for neck pain disability to

## 2018-09-11 ENCOUNTER — CLINICAL SUPPORT (OUTPATIENT)
Dept: REHABILITATION | Facility: HOSPITAL | Age: 53
End: 2018-09-11
Attending: INTERNAL MEDICINE
Payer: MEDICAID

## 2018-09-11 DIAGNOSIS — R53.1 DECREASED STRENGTH: ICD-10-CM

## 2018-09-11 DIAGNOSIS — Z74.09 DECREASED MOBILITY AND ENDURANCE: ICD-10-CM

## 2018-09-11 PROCEDURE — 97110 THERAPEUTIC EXERCISES: CPT | Mod: PN

## 2018-09-13 ENCOUNTER — CLINICAL SUPPORT (OUTPATIENT)
Dept: REHABILITATION | Facility: HOSPITAL | Age: 53
End: 2018-09-13
Attending: INTERNAL MEDICINE
Payer: MEDICAID

## 2018-09-13 DIAGNOSIS — Z74.09 DECREASED MOBILITY AND ENDURANCE: ICD-10-CM

## 2018-09-13 DIAGNOSIS — R53.1 DECREASED STRENGTH: ICD-10-CM

## 2018-09-13 PROCEDURE — 97110 THERAPEUTIC EXERCISES: CPT | Mod: PN

## 2018-09-13 NOTE — PROGRESS NOTES
"DAILY TREATMENT NOTE    DATE: 9/13/2018    Start Time:  800  Stop Time:  900    PROCEDURES:    TIMED  Procedure Min.   Therex 45   Manual therapy 10         Total Timed Minutes:  55  Total Timed Units:  4  Total Untimed Units:  0  Charges Billed/# of units:  4 TE      Progress/Current Status    Subjective:     Patient ID: Brianna Koroma is a 52 y.o. female.  Diagnosis:   1. Decreased strength     2. Decreased mobility and endurance       Pain: 5 /10  "My arms went numb again Tuesday night, and last night too but not as bad."  She thinks it was stretch (UT) with her arm behind her back that caused it.  " I felt a pop when I was doing it"    Objective:     melanie received therapeutic exercises to develop strength, endurance, ROM, flexibility and posture for 45 minutes with 1:1 by PTA including all therex as per log.  Added trial I,T, Y's with 1# weight today.  Manual therapy provided x 10 minutes including STM with elongation to B Cervical paraspinals, manual stretch to B UT with STM/MFR to same, STM/DTM/TPR to R UT and B upper thoracic release.      DATE 9/13/18 9/11/18 9/06/18 9/04/18 8/30/18 8/28/18   VISIT 11/16 10/16 9/16 8/16 7/16 6/16   POC 8/8/18         FOTO At dc 10/10 DONE 9/10 NEXT 8/10 7/10 6/10   MHP   --      MT   -- -- -- NT   Stretches:         UT stretch 30"x3 2x30'' B -- -- --    Levator scap stretch   -- -- --    Supine:         Chin tuck   --  --    Chin tuck with iso c/s extension 5"x20 5''x20 5"x20 5"x20 5" hold x 20 5" hold x 20   DNF 10"x10 20x10''  Back down to 10x next time 10"x10 10"x10 10"x10  10"x10    Cervical rotations    --     Scapular retractions    --     Dowel flexion         Serratus punches 5# dowel  20x5'' 5# dowel  20x5'' 3# dowel  5" 2x15 3# dowel  5" 2x15 3# dowel  5" 2x15 3# dowel  5" 2x12   Thoracic rotations   2x12 B 2x10 B     SL abd NT NT 2# 2x15 B 2# 2x15 B 2# 3x10 B 2# 2x10 B   SL flexion         SL gator NT NT 2# 2x10 B 1# 2x15 B 1# 3x10 B 2x10 B 1#   SL ER 3# 2x10 " "B 3# 2x10 2# 2x15 B 2# 3x10 B 2# 3x10B 2# 2x10 B   Prone:         Quadruped chin tuck    --     Chin tuck holds    --     Rows 2# 2x15 B 2x15 B 2# 2x15 B 2x15 B 2x12 B 2x10 B   I's/T's/Y's 1# 2x10 B over blue SB 2x15 B over blue SB 2x15 B over GSB at mat 2x12 B over GSB at mat 2x12 B 2x10 B   6 Pack back         Scapular retractions w/ head retraction         Standing:         Rows In Prone Prone  BTC 2x15 BTC 2x15 3x10 BTC 2x15 GTB   Lat pull downs BTB 2x15 BT 3x10 BTC 2x15 BTC 2x15 3x10 BTC 2x15 GTB   Brueggers GTB 2x10 NT RTB 2x15  Against wall RTB 2x15  Against wall RTB 3x10  Against wall RTB 2x10  Against wall   Horizontal abd GTB 2x10 NT RTB 2x15  Against wall RTB 2x15  Against wall RTB 3x10  Against wall NT   Clocks         Wall slides 20x w/shrug 20x w/ shrug 2x15 w/shrug 3x10 w/shrug 3x10 w/shrug 2x10 w/shrug             CP         INITIALS DH 1/6 TRU DH 2/6 DH 1/6 KV JA 2/6       Assessment:     Patient able to perform UT stretch with hand on mat without difficulty or pain.  Significant muscle tension/trigger point noted R UT with manual therapy which decreased after interventions.  Rated pain/soreness "2/10" after treatment.    Patient Education/Response:     Patient educated to continue HEP.  Verbalized understanding.  Perform UT stretch with hand on mat instead of behind     Plans and Goals:     Continue PT per POC, progress as able.    Short Term Goals = Long Term goals  1. Pt will demo good deep neck flexor strength to improve cervical lordosis and stabilization.  2. Pt will demo good sitting and standing posture due to psoriatic arthritis symptoms for improved spine health and decreased neck pain.  3. Pt to tolerate HEP to improve ROM and independence with ADL's  4. Report decreased neck pain </=2/10 to increase tolerance for ADLs and return to gardening.  5. Increase cervical AROM to WFL with min neck pain for increased functional mobility.  6. Increased strength in B shoulders/scapular stabilizers " to >/= 5/5 MMT grade to increase tolerance for ADL and work activities.  7. Pt will report at </= 45% on FOTO neck score for neck pain disability to

## 2018-09-20 ENCOUNTER — CLINICAL SUPPORT (OUTPATIENT)
Dept: REHABILITATION | Facility: HOSPITAL | Age: 53
End: 2018-09-20
Attending: INTERNAL MEDICINE
Payer: MEDICAID

## 2018-09-20 DIAGNOSIS — R53.1 DECREASED STRENGTH: ICD-10-CM

## 2018-09-20 DIAGNOSIS — Z74.09 DECREASED MOBILITY AND ENDURANCE: ICD-10-CM

## 2018-09-20 PROCEDURE — 97110 THERAPEUTIC EXERCISES: CPT | Mod: PN

## 2018-09-20 NOTE — PROGRESS NOTES
"DAILY TREATMENT NOTE    DATE: 9/20/2018    Start Time:  800  Stop Time:  855    PROCEDURES:    TIMED  Procedure Min.   Manual therapy 12   Therex 43           Total Timed Minutes:  55  Total Timed Units:  4  Total Untimed Units:  0  Charges Billed/# of units:  4 TE      Progress/Current Status    Subjective:     Patient ID: Brianna Koroma is a 52 y.o. female.  Diagnosis:   1. Decreased strength     2. Decreased mobility and endurance       Pain: 5 /10  "I've had a flare up - I never know when its going to happen.  It woke me up last night tingle then numb/heavy/dead feeling both arms, left more."    Objective:     Manual therapy provided x 10 minutes including STM with elongation to B Cervical paraspinals, manual stretch to B UT with STM/MFR to same, STM/DTM/TPR to R UT and B upper thoracic release.  Brianna received therapeutic exercises to develop strength, endurance, ROM, flexibility and posture for 45 minutes with 1:1 by PTA including all therex as per log.  Modified reps as noted.      DATE 9/20/18 9/13/18 9/11/18 9/06/18 9/04/18 8/30/18 8/28/18   VISIT 12/16 11/16 10/16 9/16 8/16 7/16 6/16   POC 8/8/18          FOTO At d/c At [place] 10/10 DONE 9/10 NEXT 8/10 7/10 6/10   MHP    --      MT    -- -- -- NT   Stretches:          UT stretch 30"x3 B 30"x3 2x30'' B -- -- --    Levator scap stretch    -- -- --    Supine:          Chin tuck    --  --    Chin tuck with iso c/s extension 5"x20 5"x20 5''x20 5"x20 5"x20 5" hold x 20 5" hold x 20   DNF 10"x10 10"x10 20x10''  Back down to 10x next time 10"x10 10"x10 10"x10  10"x10    Cervical rotations     --     Scapular retractions     --     Dowel flexion          Serratus punches 5# dowel  5"x20 5# dowel  20x5'' 5# dowel  20x5'' 3# dowel  5" 2x15 3# dowel  5" 2x15 3# dowel  5" 2x15 3# dowel  5" 2x12   Thoracic rotations    2x12 B 2x10 B     SL abd 2# 2x10 B NT NT 2# 2x15 B 2# 2x15 B 2# 3x10 B 2# 2x10 B   SL flexion          SL gator 2# 2x10 B NT NT 2# 2x10 B 1# 2x15 B 1# " "3x10 B 2x10 B 1#   SL ER 2# 2x10 B 3# 2x10 B 3# 2x10 2# 2x15 B 2# 3x10 B 2# 3x10B 2# 2x10 B   Prone:          Quadruped chin tuck --    --     Chin tuck holds --    --     Rows 2# 2x10 B 2# 2x15 B 2x15 B 2# 2x15 B 2x15 B 2x12 B 2x10 B   I's/T's/Y's 1# 2x10 B over blue SB 1# 2x10 B over blue SB 2x15 B over blue SB 2x15 B over GSB at mat 2x12 B over GSB at mat 2x12 B 2x10 B   6 Pack back          Scapular retractions w/ head retraction          Standing:          Rows prone In Prone Prone  BTC 2x15 BTC 2x15 3x10 BTC 2x15 GTB   Lat pull downs BTV  BTB 2x15 BT 3x10 BTC 2x15 BTC 2x15 3x10 BTC 2x15 GTB   Brueggers GTB 2x10 GTB 2x10 NT RTB 2x15  Against wall RTB 2x15  Against wall RTB 3x10  Against wall RTB 2x10  Against wall   Horizontal abd GTB 2x10 GTB 2x10 NT RTB 2x15  Against wall RTB 2x15  Against wall RTB 3x10  Against wall NT   Clocks          Wall slides 2x10 w/shrug 20x w/shrug 20x w/ shrug 2x15 w/shrug 3x10 w/shrug 3x10 w/shrug 2x10 w/shrug              CP          INITIALS DH 2/6 DH 1/6 TRU DH 2/6 DH 1/6 KV JA 2/6       Assessment:     Patient able to complete modified program today with decreased reps as noted.  Monitored throughout without complaint of increased pain or difficulty.  States "I feel stiff, but no pain now." after treatment.    Patient Education/Response:     Patient educated to continue HEP.  Verbalized understanding.    Plans and Goals:     Continue PT per POC, progress as able.  Monitor response to today's treatment.    Short Term Goals = Long Term goals  1. Pt will demo good deep neck flexor strength to improve cervical lordosis and stabilization.  2. Pt will demo good sitting and standing posture due to psoriatic arthritis symptoms for improved spine health and decreased neck pain.  3. Pt to tolerate HEP to improve ROM and independence with ADL's  4. Report decreased neck pain </=2/10 to increase tolerance for ADLs and return to gardening.  5. Increase cervical AROM to WFL with min neck " pain for increased functional mobility.  6. Increased strength in B shoulders/scapular stabilizers to >/= 5/5 MMT grade to increase tolerance for ADL and work activities.  7. Pt will report at </= 45% on FOTO neck score for neck pain disability to

## 2018-11-04 ENCOUNTER — HOSPITAL ENCOUNTER (EMERGENCY)
Facility: HOSPITAL | Age: 53
Discharge: HOME | End: 2018-11-04
Attending: EMERGENCY MEDICINE
Payer: MEDICARE

## 2018-11-04 VITALS
SYSTOLIC BLOOD PRESSURE: 123 MMHG | DIASTOLIC BLOOD PRESSURE: 79 MMHG | BODY MASS INDEX: 35.85 KG/M2 | TEMPERATURE: 98 F | RESPIRATION RATE: 19 BRPM | HEIGHT: 64 IN | WEIGHT: 210 LBS | HEART RATE: 83 BPM | OXYGEN SATURATION: 98 %

## 2018-11-04 DIAGNOSIS — L40.50 PSORIATIC ARTHRITIS: Primary | ICD-10-CM

## 2018-11-04 DIAGNOSIS — H04.122 DRY EYE OF LEFT SIDE: ICD-10-CM

## 2018-11-04 PROCEDURE — 99283 EMERGENCY DEPT VISIT LOW MDM: CPT

## 2018-11-04 PROCEDURE — 63600175 PHARM REV CODE 636 W HCPCS: Performed by: EMERGENCY MEDICINE

## 2018-11-04 PROCEDURE — 25000003 PHARM REV CODE 250: Performed by: EMERGENCY MEDICINE

## 2018-11-04 RX ORDER — TETRACAINE HYDROCHLORIDE 5 MG/ML
2 SOLUTION OPHTHALMIC
Status: DISCONTINUED | OUTPATIENT
Start: 2018-11-04 | End: 2018-11-04

## 2018-11-04 RX ORDER — CYCLOBENZAPRINE HCL 5 MG
5 TABLET ORAL 3 TIMES DAILY PRN
COMMUNITY

## 2018-11-04 RX ORDER — PROPARACAINE HYDROCHLORIDE 5 MG/ML
1 SOLUTION/ DROPS OPHTHALMIC
Status: COMPLETED | OUTPATIENT
Start: 2018-11-04 | End: 2018-11-04

## 2018-11-04 RX ORDER — NAPROXEN 500 MG/1
500 TABLET ORAL 2 TIMES DAILY
COMMUNITY

## 2018-11-04 RX ORDER — OMEPRAZOLE 40 MG/1
40 CAPSULE, DELAYED RELEASE ORAL DAILY
COMMUNITY

## 2018-11-04 RX ADMIN — PROPARACAINE HYDROCHLORIDE 1 DROP: 5 SOLUTION/ DROPS OPHTHALMIC at 11:11

## 2018-11-04 RX ADMIN — FLUORESCEIN SODIUM AND BENOXINATE HYDROCHLORIDE 1 DROP: 4; 2.5 SOLUTION OPHTHALMIC at 11:11

## 2018-11-04 NOTE — ED PROVIDER NOTES
CHIEF COMPLAINT  Chief Complaint   Patient presents with    Eye Pain     pt presents to ED today c/o left eye pain onset this am denies changes in vision        HPI  Brianna Koroma is a 53 y.o. female who presents with left eye pain after waking up this morning around 0730. She describes her pain as a stinging pain initially which developed into a pressure towards the rear of her eye. She also states that she is having some memory loss today, stating she is having trouble remembering her medical history. She reports subjective fever last night with myalgias. Patient was seen at an Urgent care for this issue today, and was referred to the ED for further evaluation. She reports no relief with use of an ice pack, and has not taken any medication for her symptoms. Patient denies any change or loss in vision, rash, dysuria, chills. She has no prior history of similar eye issues. Patient states she has no history of shingles, but has not been vaccinated for shingles. She does have a history of psoriatic arthritis, and is currently taking Humira for this.     This is the extent of the patient's complaints at this time.       PAST MEDICAL HISTORY  Past Medical History:   Diagnosis Date    Allergy     Anxiety     Arthritis     Asthma     Diabetes mellitus     Hyperlipidemia        CURRENT MEDICATIONS    No current facility-administered medications for this encounter.     Current Outpatient Medications:     adalimumab (HUMIRA) 40 mg/0.4 mL SyKt, Inject 40 mg into the skin every 14 (fourteen) days., Disp: , Rfl:     cyclobenzaprine (FLEXERIL) 5 MG tablet, Take 5 mg by mouth 3 (three) times daily as needed for Muscle spasms., Disp: , Rfl:     fluoxetine (PROZAC) 40 MG capsule, Take 40 mg by mouth every evening., Disp: , Rfl:     fluticasone (FLONASE) 50 mcg/actuation nasal spray, 1 spray by Each Nare route once daily., Disp: , Rfl:     GABAPENTIN ORAL, Take by mouth., Disp: , Rfl:     insulin NPH (NOVOLIN N)  100 unit/mL injection, Inject into the skin 2 (two) times daily before meals. 29 units in am, and 19 units in pm, Disp: , Rfl:     LISINOPRIL ORAL, Take by mouth once daily., Disp: , Rfl:     metformin (GLUCOPHAGE) 500 MG tablet, Take 500 mg by mouth 2 (two) times daily with meals., Disp: , Rfl:     naproxen (NAPROSYN) 500 MG tablet, Take 500 mg by mouth 2 (two) times daily., Disp: , Rfl:     omeprazole (PRILOSEC) 40 MG capsule, Take 40 mg by mouth once daily., Disp: , Rfl:     simvastatin (ZOCOR) 20 MG tablet, Take 20 mg by mouth every evening., Disp: , Rfl:     SULFASALAZINE ORAL, Take by mouth 2 (two) times daily., Disp: , Rfl:     multivitamin Liqd, Take by mouth., Disp: , Rfl:     peg 400-propylene glycol, PF, (SYSTANE ULTRA, PF,) 0.4-0.3 % Dpet, Apply 2 drops to eye 3 (three) times daily. for 7 days, Disp: 1 each, Rfl: 0    ALLERGIES    Review of patient's allergies indicates:   Allergen Reactions    Pcn [penicillins] Hives       SURGICAL HISTORY    Past Surgical History:   Procedure Laterality Date    CHOLECYSTECTOMY      MOUTH SURGERY      jaw sx    TONSILLECTOMY         SOCIAL HISTORY    Social History     Socioeconomic History    Marital status:      Spouse name: None    Number of children: None    Years of education: None    Highest education level: None   Social Needs    Financial resource strain: None    Food insecurity - worry: None    Food insecurity - inability: None    Transportation needs - medical: None    Transportation needs - non-medical: None   Occupational History    None   Tobacco Use    Smoking status: Former Smoker    Smokeless tobacco: Never Used   Substance and Sexual Activity    Alcohol use: Yes     Comment: socially    Drug use: None    Sexual activity: None   Other Topics Concern    None   Social History Narrative    None       FAMILY HISTORY    Family History   Problem Relation Age of Onset    Heart disease Mother     Diabetes Mellitus Mother   "   Lupus Mother     Rheum arthritis Mother     Hypertension Mother     Thyroid disease Mother     Diabetes Mellitus Father     Heart disease Father     Hypertension Father     Kidney disease Father        REVIEW OF SYSTEMS    Eyes: Left eye pain  All Systems otherwise negative except as noted in the Review of Systems and History of Present Illness.    PHYSICAL EXAM    VITAL SIGNS: /79   Pulse 83   Temp 98 °F (36.7 °C) (Oral)   Resp 19   Ht 5' 4" (1.626 m)   Wt 95.3 kg (210 lb)   SpO2 98%   BMI 36.05 kg/m²    Constitutional:  In mild distress.  Well developed, well nourished, alert & oriented x 3, non-toxic appearance.   HENT:  Normocephalic, atraumatic.  Normal ears, nose, and throat.  Eyes: vision 20/25 bilaterally. No conjunctival injection. No corneal abrasion. No evidence of cell & flare, does not have photophobia, symptomatic improvement after tetracaine administered, Eye pressure 7, 11, 14.   Neck: Normal range of motion  Respiratory: Unlabored breathing  Cardiovascular:  Regular rate  GI:  Soft, nontender, no rebound or guarding.  Musculoskeletal: No bony deformities with normal range of motion  Integument:  Warm, dry skin without infection or injury.  Neurologic:  Normal motor, sensation with no focal deficit.  Psychiatric:  Mood and affect normal. No SI, HI and not gravely disabled.      LABS  Pertinent labs reviewed. (See chart for details)   Labs Reviewed - No data to display      RADIOLOGY    No orders to display         PROCEDURES    Procedures    Medications   proparacaine 0.5 % ophthalmic solution 1 drop (1 drop Left Eye Given 11/4/18 1115)   fluorescein-benoxinate 0.25-0.4% ophthalmic solution 1 drop (1 drop Left Eye Given 11/4/18 1115)       ED COURSE & MEDICAL DECISION MAKING      Pertinent & Imaging studies reviewed. (See chart for details)    Differential Diagnosis: Uveitis, glaucoma, less likely retinal detachment, unlikely hyphema, unlikely open globe, unlikely " conjunctivitis, unlikely foreign body    ED Course as of Nov 04 1325   Sun Nov 04, 2018   1115 Awaiting medications  [MG]   1147 Awaiting callback from [facility]. Pressures normal, no corneal abrasion, improvement after proparacaine, no obvious cell or flare on slit lamp exam. Vision 20/25 both eyes  [MG]   1154 Spoke with Dr Kothari, will put on lubricant drop, and will have her call [phone]  [MG]      ED Course User Index  [MG] Junie Jane MD         This SmartLink is deprecated. Use AVSMEDLIST instead to display the medication list for a patient.    This SmartLink is deprecated. Use AVSMEDLIST instead to display the medication list for a patient.      OVERALL IMPRESSION:     This is a 53-year-old female, with a history of psoriatic arthritis, presents with left eye pain. Physical exam is notable for no vision loss, visual acuity of 20/25 bilaterally, no conjunctival injection or edema, normal eye pressures, no evidence of a corneal abrasion, she did have complete resolution of symptoms after proparacaine.  She has no photophobia, consensual or otherwise.  Physical exam without obvious cell or flare.  She has been complaining of dry eyes recently.  In conversation, Dr. Alas, on-call ophthalmologist, on the [facility] Call line, patient should start on a lubricant drops, if she is not improving, she can call to make an appointment, or see her own ophthalmologist.      FINAL DIAGNOSIS  1. Psoriatic arthritis    2. Dry eye of left side        DISPOSITION  Patient discharged in stable condition.    I discussed with patient and/or family/caretaker that this evaluation in the ED does not suggest any emergent or life threatening condition medical condition requiring admission or immediate intervention beyond what was provided in the ED.  Regardless, an unremarkable evaluation in the ED does not preclude the development or presence of a serious or life threatening condition. As such, patient was instructed to return  immediately for any worsening or change in current symptoms.         Please pardon typos or dictation errors, as this note was transcribed using [employer] direct dictation software.      I have reviewed the note created by Raoul bella  and agree with their documentation of this patient.      Junie Jane MD  Emergency Medicine  [facility]  11/4/2018 10:56 AM     uJnie Jane MD  11/04/18 2320

## 2018-11-04 NOTE — DISCHARGE INSTRUCTIONS
PLEASE PLACE THE EYE DROPS ON YOUR EYE THREE TIMES DAILY  PLEASE CALL THE EYE DOCTOR, [phone] TO BE SEEN  IF YOU ARE NOT HAVING SIGNIFICANT IMPROVEMENT  RETURN TO THE ED FOR ANY WORSENING SYMPTOMS

## 2018-11-05 NOTE — ED PROVIDER NOTES
Encounter Date: 11/4/2018     SCRIBE #1 NOTE: I, Mario Lloyd, am scribing for, and in the presence of,  Dr. Jane. I have scribed the entire note.         History     Chief Complaint   Patient presents with    Eye Pain     pt presents to ED today c/o left eye pain onset this am denies changes in vision      HPI  Review of patient's allergies indicates:   Allergen Reactions    Pcn [penicillins] Hives     Past Medical History:   Diagnosis Date    Allergy     Anxiety     Arthritis     Asthma     Diabetes mellitus     Hyperlipidemia      Past Surgical History:   Procedure Laterality Date    CHOLECYSTECTOMY      MOUTH SURGERY      jaw sx    TONSILLECTOMY       Family History   Problem Relation Age of Onset    Heart disease Mother     Diabetes Mellitus Mother     Lupus Mother     Rheum arthritis Mother     Hypertension Mother     Thyroid disease Mother     Diabetes Mellitus Father     Heart disease Father     Hypertension Father     Kidney disease Father      Social History     Tobacco Use    Smoking status: Former Smoker    Smokeless tobacco: Never Used   Substance Use Topics    Alcohol use: Yes     Comment: socially    Drug use: Not on file     Review of Systems    Physical Exam     Initial Vitals [11/04/18 1036]   BP Pulse Resp Temp SpO2   123/79 83 19 98 °F (36.7 °C) 98 %      MAP       --         Physical Exam    ED Course   Procedures  Labs Reviewed - No data to display       Imaging Results    None                            ED Course as of Nov 04 1820   Sun Nov 04, 2018   1115 Awaiting medications  [MG]   1147 Awaiting callback from optho. Pressures normal, no corneal abrasion, improvement after proparacaine, no obvious cell or flare on slit lamp exam. Vision 20/25 both eyes  [MG]   1154 Spoke with Dr Kothari, will put on lubricant drop, and will have her call [phone]  [MG]      ED Course User Index  [MG] Junie Jane MD     Clinical Impression:   {Add your Clinical Impression  here. If you haven't documented one yet, please pend the note, finalize a Clinical Impression, and refresh your note before signing.:[id number]}

## 2019-08-26 ENCOUNTER — OFFICE VISIT (OUTPATIENT)
Dept: URGENT CARE | Facility: CLINIC | Age: 54
End: 2019-08-26
Payer: MEDICARE

## 2019-08-26 VITALS
WEIGHT: 207 LBS | SYSTOLIC BLOOD PRESSURE: 124 MMHG | TEMPERATURE: 98 F | OXYGEN SATURATION: 95 % | HEIGHT: 64 IN | RESPIRATION RATE: 18 BRPM | HEART RATE: 82 BPM | DIASTOLIC BLOOD PRESSURE: 77 MMHG | BODY MASS INDEX: 35.34 KG/M2

## 2019-08-26 DIAGNOSIS — R30.0 DYSURIA: Primary | ICD-10-CM

## 2019-08-26 LAB
BILIRUB UR QL STRIP: NEGATIVE
GLUCOSE UR QL STRIP: NEGATIVE
KETONES UR QL STRIP: NEGATIVE
LEUKOCYTE ESTERASE UR QL STRIP: NEGATIVE
PH, POC UA: 5 (ref 5–8)
POC BLOOD, URINE: NEGATIVE
POC NITRATES, URINE: NEGATIVE
PROT UR QL STRIP: NEGATIVE
SP GR UR STRIP: 1.02 (ref 1–1.03)
UROBILINOGEN UR STRIP-ACNC: NORMAL (ref 0.1–1.1)

## 2019-08-26 PROCEDURE — 87086 URINE CULTURE/COLONY COUNT: CPT

## 2019-08-26 PROCEDURE — 81003 URINALYSIS AUTO W/O SCOPE: CPT | Mod: QW,S$GLB,, | Performed by: NURSE PRACTITIONER

## 2019-08-26 PROCEDURE — 3008F BODY MASS INDEX DOCD: CPT | Mod: CPTII,S$GLB,, | Performed by: NURSE PRACTITIONER

## 2019-08-26 PROCEDURE — 99213 PR OFFICE/OUTPT VISIT, EST, LEVL III, 20-29 MIN: ICD-10-PCS | Mod: 25,S$GLB,, | Performed by: NURSE PRACTITIONER

## 2019-08-26 PROCEDURE — 3008F PR BODY MASS INDEX (BMI) DOCUMENTED: ICD-10-PCS | Mod: CPTII,S$GLB,, | Performed by: NURSE PRACTITIONER

## 2019-08-26 PROCEDURE — 99213 OFFICE O/P EST LOW 20 MIN: CPT | Mod: 25,S$GLB,, | Performed by: NURSE PRACTITIONER

## 2019-08-26 PROCEDURE — 81003 POCT URINALYSIS, DIPSTICK, AUTOMATED, W/O SCOPE: ICD-10-PCS | Mod: QW,S$GLB,, | Performed by: NURSE PRACTITIONER

## 2019-08-26 RX ORDER — SIMVASTATIN 40 MG/1
TABLET, FILM COATED ORAL
Refills: 3 | COMMUNITY
Start: 2019-08-05

## 2019-08-26 RX ORDER — METFORMIN HYDROCHLORIDE 1000 MG/1
TABLET ORAL
Refills: 1 | COMMUNITY
Start: 2019-08-05

## 2019-08-26 RX ORDER — LIRAGLUTIDE 6 MG/ML
INJECTION SUBCUTANEOUS
Refills: 3 | COMMUNITY
Start: 2019-06-07

## 2019-08-26 NOTE — PATIENT INSTRUCTIONS
"Return to Urgent Care or go to ER if symptoms worsen or fail to improve.  Follow up with PCP as recommended for further management.   We will call you in 3-5 days with the results of your urine culture.       Dysuria     Painful urination (dysuria) is often caused by a problem in the urinary tract.   Dysuria is pain felt during urination. It is often described as a burning. Learn more about this problem and how it can be treated.  What causes dysuria?  Possible causes include:  · Infection with a bacteria or virus such as a urinary tract infection (UTI or a sexually transmitted infection (STI)  · Sensitivity or allergy to chemicals such as those found in lotions and other products  · Prostate or bladder problems  · Radiation therapy to the pelvic area  How is dysuria diagnosed?  Your healthcare provider will examine you. He or she will ask about your symptoms and health. After talking with you and doing a physical exam, your healthcare provider may know what is causing your dysuria. He or she will usually request  a sample of your urine. Tests of your urine, or a "urinalysis," are done. A urinalysis may include:  · Looking at the urine sample (visual exam)  · Checking for substances (chemical exam)  · Looking at a small amount under a microscope (microscopic exam)  Some parts of the urinalysis may be done in the provider's office and some in a lab. And, the urine sample may be checked for bacteria and yeast (urine culture). Your healthcare provider will tell you more about these tests if they are needed.  How is dysuria treated?  Treatment depends on the cause. If you have a bacterial infection, you may need antibiotics. You may be given medicines to make it easier for you to urinate and help relieve pain. Your healthcare provider can tell you more about your treatment options. Untreated, symptoms may get worse.  When to call your healthcare provider  Call the healthcare provider right away if you have any of the " following:  · Fever of 100.4°F (38°C) or higher   · No improvement after three days of treatment  · Trouble urinating because of pain  · New or increased discharge from the vagina or penis  · Rash or joint pain  · Increased back or abdominal pain  · Enlarged painful lymph nodes (lumps) in the groin   Date Last Reviewed: 1/1/2017  © 7960-3907 [employer]. [address] [place] [address]. All rights reserved. This information is not intended as a substitute for professional medical care. Always follow your healthcare professional's instructions.

## 2019-08-26 NOTE — PROGRESS NOTES
"Subjective:       Patient ID: Brianna Koroma is a 53 y.o. female.    Vitals:  height is 5' 4" (1.626 m) and weight is 93.9 kg (207 lb). Her oral temperature is 98.3 °F (36.8 °C). Her blood pressure is 124/77 and her pulse is 82. Her respiration is 18 and oxygen saturation is 95%.     Chief Complaint: Urinary Tract Infection    This is a 53 y.o. female who presents today with a chief complaint of   UTI, pressure and burning when trying to urinate. Pt not taking ryder med. Sx started on Friday.  Pt. Is leaving tomorrow to go to [place] for 3 months. No fever.  No flank pain. No decreased urine.     Urinary Tract Infection    This is a new problem. The current episode started in the past 7 days. The problem occurs every urination. The problem has been gradually worsening. The quality of the pain is described as aching and burning. The pain is at a severity of 4/10. The pain is mild. There has been no fever. She is sexually active. There is no history of pyelonephritis. Associated symptoms include frequency and urgency. Pertinent negatives include no chills, flank pain, hematuria, nausea, vomiting or rash. She has tried nothing for the symptoms. The treatment provided no relief.       Constitution: Negative for chills and fever.   Neck: Negative for painful lymph nodes.   Gastrointestinal: Negative for abdominal pain, nausea and vomiting.   Genitourinary: Positive for dysuria, frequency and urgency. Negative for urine decreased, flank pain, hematuria, history of kidney stones, painful menstruation, irregular menstruation, missed menses, heavy menstrual bleeding, ovarian cysts, genital trauma, vaginal pain, vaginal discharge, vaginal bleeding, vaginal odor, painful intercourse, genital sore, painful ejaculation and pelvic pain.   Musculoskeletal: Negative for back pain.   Skin: Negative for rash and lesion.   Hematologic/Lymphatic: Negative for swollen lymph nodes.       Objective:      Physical Exam   Constitutional: " She is oriented to person, place, and time. She appears well-developed and well-nourished.   HENT:   Head: Normocephalic and atraumatic.   Right Ear: External ear normal.   Left Ear: External ear normal.   Nose: Nose normal.   Mouth/Throat: Oropharynx is clear and moist.   Eyes: Pupils are equal, round, and reactive to light. EOM are normal.   Neck: Normal range of motion. Neck supple.   Cardiovascular: Normal rate and regular rhythm.   Pulses:       Radial pulses are 2+ on the right side, and 2+ on the left side.   Pulmonary/Chest: Effort normal and breath sounds normal. No stridor. No respiratory distress. She has no wheezes.   Abdominal: Soft. Normal appearance and bowel sounds are normal. She exhibits no distension. There is no tenderness. There is no CVA tenderness.   Neurological: She is alert and oriented to person, place, and time. She exhibits normal muscle tone. Coordination and gait normal.   Skin: Skin is warm, dry and intact. No rash noted.   Psychiatric: She has a normal mood and affect. Her speech is normal and behavior is normal.   Nursing note and vitals reviewed.      Assessment:       1. Dysuria        Plan:         Dysuria  -     POCT Urinalysis, Dipstick, Automated, W/O Scope  -     Culture, Urine

## 2019-08-27 LAB
BACTERIA UR CULT: NORMAL
BACTERIA UR CULT: NORMAL

## 2023-05-06 ENCOUNTER — HOSPITAL ENCOUNTER (EMERGENCY)
Facility: HOSPITAL | Age: 58
Discharge: HOME | End: 2023-05-06
Attending: EMERGENCY MEDICINE
Payer: MEDICARE

## 2023-05-06 VITALS
OXYGEN SATURATION: 96 % | SYSTOLIC BLOOD PRESSURE: 130 MMHG | WEIGHT: 212 LBS | BODY MASS INDEX: 36.19 KG/M2 | HEART RATE: 68 BPM | TEMPERATURE: 98 F | HEIGHT: 64 IN | RESPIRATION RATE: 18 BRPM | DIASTOLIC BLOOD PRESSURE: 67 MMHG

## 2023-05-06 DIAGNOSIS — J45.909 ASTHMA, UNSPECIFIED ASTHMA SEVERITY, UNSPECIFIED WHETHER COMPLICATED, UNSPECIFIED WHETHER PERSISTENT: Primary | ICD-10-CM

## 2023-05-06 DIAGNOSIS — H10.33 ACUTE CONJUNCTIVITIS OF BOTH EYES, UNSPECIFIED ACUTE CONJUNCTIVITIS TYPE: ICD-10-CM

## 2023-05-06 DIAGNOSIS — R05.9 COUGH: ICD-10-CM

## 2023-05-06 DIAGNOSIS — H10.30 ACUTE CONJUNCTIVITIS, UNSPECIFIED ACUTE CONJUNCTIVITIS TYPE, UNSPECIFIED LATERALITY: ICD-10-CM

## 2023-05-06 LAB
ALBUMIN SERPL BCP-MCNC: 3.9 G/DL (ref 3.5–5.2)
ALP SERPL-CCNC: 82 U/L (ref 55–135)
ALT SERPL W/O P-5'-P-CCNC: 22 U/L (ref 10–44)
ANION GAP SERPL CALC-SCNC: 8 MMOL/L (ref 8–16)
AST SERPL-CCNC: 23 U/L (ref 10–40)
BASOPHILS # BLD AUTO: 0.03 K/UL (ref 0–0.2)
BASOPHILS NFR BLD: 0.5 % (ref 0–1.9)
BILIRUB SERPL-MCNC: 0.2 MG/DL (ref 0.1–1)
BUN SERPL-MCNC: 19 MG/DL (ref 6–20)
CALCIUM SERPL-MCNC: 9.2 MG/DL (ref 8.7–10.5)
CHLORIDE SERPL-SCNC: 108 MMOL/L (ref 95–110)
CO2 SERPL-SCNC: 23 MMOL/L (ref 23–29)
CREAT SERPL-MCNC: 1.3 MG/DL (ref 0.5–1.4)
DIFFERENTIAL METHOD: NORMAL
EOSINOPHIL # BLD AUTO: 0.3 K/UL (ref 0–0.5)
EOSINOPHIL NFR BLD: 4.9 % (ref 0–8)
ERYTHROCYTE [DISTWIDTH] IN BLOOD BY AUTOMATED COUNT: 13.9 % (ref 11.5–14.5)
EST. GFR  (NO RACE VARIABLE): 48 ML/MIN/1.73 M^2
GLUCOSE SERPL-MCNC: 116 MG/DL (ref 70–110)
HCT VFR BLD AUTO: 37.9 % (ref 37–48.5)
HGB BLD-MCNC: 12.3 G/DL (ref 12–16)
IMM GRANULOCYTES # BLD AUTO: 0.02 K/UL (ref 0–0.04)
IMM GRANULOCYTES NFR BLD AUTO: 0.3 % (ref 0–0.5)
INFLUENZA A, MOLECULAR: NEGATIVE
INFLUENZA B, MOLECULAR: NEGATIVE
LIPASE SERPL-CCNC: 19 U/L (ref 4–60)
LYMPHOCYTES # BLD AUTO: 2.3 K/UL (ref 1–4.8)
LYMPHOCYTES NFR BLD: 34.9 % (ref 18–48)
MCH RBC QN AUTO: 28.2 PG (ref 27–31)
MCHC RBC AUTO-ENTMCNC: 32.5 G/DL (ref 32–36)
MCV RBC AUTO: 87 FL (ref 82–98)
MONOCYTES # BLD AUTO: 0.5 K/UL (ref 0.3–1)
MONOCYTES NFR BLD: 7.1 % (ref 4–15)
NEUTROPHILS # BLD AUTO: 3.5 K/UL (ref 1.8–7.7)
NEUTROPHILS NFR BLD: 52.3 % (ref 38–73)
NRBC BLD-RTO: 0 /100 WBC
PLATELET # BLD AUTO: 187 K/UL (ref 150–450)
PMV BLD AUTO: 12.6 FL (ref 9.2–12.9)
POTASSIUM SERPL-SCNC: 4.1 MMOL/L (ref 3.5–5.1)
PROT SERPL-MCNC: 8 G/DL (ref 6–8.4)
RBC # BLD AUTO: 4.36 M/UL (ref 4–5.4)
SARS-COV-2 RDRP RESP QL NAA+PROBE: NEGATIVE
SODIUM SERPL-SCNC: 139 MMOL/L (ref 136–145)
SPECIMEN SOURCE: NORMAL
TROPONIN I SERPL DL<=0.01 NG/ML-MCNC: <0.006 NG/ML (ref 0–0.03)
WBC # BLD AUTO: 6.59 K/UL (ref 3.9–12.7)

## 2023-05-06 PROCEDURE — 63600175 PHARM REV CODE 636 W HCPCS: Performed by: PHYSICIAN ASSISTANT

## 2023-05-06 PROCEDURE — 83690 ASSAY OF LIPASE: CPT | Performed by: PHYSICIAN ASSISTANT

## 2023-05-06 PROCEDURE — 25000242 PHARM REV CODE 250 ALT 637 W/ HCPCS: Performed by: PHYSICIAN ASSISTANT

## 2023-05-06 PROCEDURE — U0002 COVID-19 LAB TEST NON-CDC: HCPCS | Performed by: PHYSICIAN ASSISTANT

## 2023-05-06 PROCEDURE — 93010 EKG 12-LEAD: ICD-10-PCS | Mod: ,,, | Performed by: INTERNAL MEDICINE

## 2023-05-06 PROCEDURE — 93005 ELECTROCARDIOGRAM TRACING: CPT

## 2023-05-06 PROCEDURE — 94640 AIRWAY INHALATION TREATMENT: CPT

## 2023-05-06 PROCEDURE — 94760 N-INVAS EAR/PLS OXIMETRY 1: CPT

## 2023-05-06 PROCEDURE — 84484 ASSAY OF TROPONIN QUANT: CPT | Performed by: PHYSICIAN ASSISTANT

## 2023-05-06 PROCEDURE — 80053 COMPREHEN METABOLIC PANEL: CPT | Performed by: PHYSICIAN ASSISTANT

## 2023-05-06 PROCEDURE — 99285 EMERGENCY DEPT VISIT HI MDM: CPT | Mod: 25

## 2023-05-06 PROCEDURE — 87502 INFLUENZA DNA AMP PROBE: CPT | Performed by: PHYSICIAN ASSISTANT

## 2023-05-06 PROCEDURE — 93010 ELECTROCARDIOGRAM REPORT: CPT | Mod: ,,, | Performed by: INTERNAL MEDICINE

## 2023-05-06 PROCEDURE — 94761 N-INVAS EAR/PLS OXIMETRY MLT: CPT

## 2023-05-06 PROCEDURE — 25000003 PHARM REV CODE 250: Performed by: PHYSICIAN ASSISTANT

## 2023-05-06 PROCEDURE — 85025 COMPLETE CBC W/AUTO DIFF WBC: CPT | Performed by: PHYSICIAN ASSISTANT

## 2023-05-06 RX ORDER — PROPARACAINE HYDROCHLORIDE 5 MG/ML
2 SOLUTION/ DROPS OPHTHALMIC
Status: DISCONTINUED | OUTPATIENT
Start: 2023-05-06 | End: 2023-05-06

## 2023-05-06 RX ORDER — IPRATROPIUM BROMIDE AND ALBUTEROL SULFATE 2.5; .5 MG/3ML; MG/3ML
3 SOLUTION RESPIRATORY (INHALATION)
Status: COMPLETED | OUTPATIENT
Start: 2023-05-06 | End: 2023-05-06

## 2023-05-06 RX ORDER — ALBUTEROL SULFATE 90 UG/1
1-2 AEROSOL, METERED RESPIRATORY (INHALATION) EVERY 6 HOURS PRN
Qty: 8 G | Refills: 0 | Status: SHIPPED | OUTPATIENT
Start: 2023-05-06 | End: 2024-05-05

## 2023-05-06 RX ORDER — PREDNISONE 20 MG/1
60 TABLET ORAL
Status: COMPLETED | OUTPATIENT
Start: 2023-05-06 | End: 2023-05-06

## 2023-05-06 RX ORDER — ERYTHROMYCIN 5 MG/G
OINTMENT OPHTHALMIC
Qty: 3.5 G | Refills: 0 | Status: SHIPPED | OUTPATIENT
Start: 2023-05-06

## 2023-05-06 RX ORDER — PROPARACAINE HYDROCHLORIDE 5 MG/ML
2 SOLUTION/ DROPS OPHTHALMIC
Status: COMPLETED | OUTPATIENT
Start: 2023-05-06 | End: 2023-05-06

## 2023-05-06 RX ORDER — PREDNISONE 20 MG/1
20 TABLET ORAL DAILY
Qty: 3 TABLET | Refills: 0 | Status: SHIPPED | OUTPATIENT
Start: 2023-05-06 | End: 2023-05-09

## 2023-05-06 RX ADMIN — FLUORESCEIN SODIUM 1 EACH: 1 STRIP OPHTHALMIC at 10:05

## 2023-05-06 RX ADMIN — PREDNISONE 60 MG: 20 TABLET ORAL at 12:05

## 2023-05-06 RX ADMIN — PROPARACAINE HYDROCHLORIDE 2 DROP: 5 SOLUTION/ DROPS OPHTHALMIC at 11:05

## 2023-05-06 RX ADMIN — IPRATROPIUM BROMIDE AND ALBUTEROL SULFATE 3 ML: .5; 3 SOLUTION RESPIRATORY (INHALATION) at 12:05

## 2023-05-06 NOTE — ED PROVIDER NOTES
Encounter Date: 5/6/2023       History     Chief Complaint   Patient presents with    Conjunctivitis     Of bilateral eyes since Monday and not responding to prescribed meds. Pt has now noted a small, scleral hemorrhage to bottom of left eye. No fever; blurred vision+ with generalized HA.     Bronchitis     With productive cough since Tuesday. Pt has finished Z-pack today, and yellow phlegm+ noted. No CP or SOB. No distress.      57-year-old female, PMH dm, anxiety, asthma, HLD, psoriatic arthritis, presents to ED with multiple complaints.  She reports 5 days ago she began having bilateral eye redness, itching and drainage.  She does report known exposure to her granddaughter who also had similar symptoms at that time. Does not wear contact lenses.  Denying eye trauma.  The following day she began having productive cough and intermittent wheezing.  She has used her home albuterol inhaler with mild but temporary improvement.  Denying headaches, visual changes, floaters/flashes of light, nausea, vomiting, numbness, focal weakness, chest pain, shortness of breath, abdominal pain, nausea, vomiting, diarrhea, urinary complaints.  She lives in [place] and was prescribed Deangelo-Poly-Dex ophthalmic ointment and a Z-Jose M by her PCP.  She has used these medications but with no improvement. No other acute complaints at this time.    The history is provided by the patient.   Review of patient's allergies indicates:   Allergen Reactions    Pcn [penicillins] Hives     Past Medical History:   Diagnosis Date    Allergy     Anxiety     Arthritis     Asthma     Diabetes mellitus     Hyperlipidemia      Past Surgical History:   Procedure Laterality Date    CHOLECYSTECTOMY      MOUTH SURGERY      jaw sx    TONSILLECTOMY       Family History   Problem Relation Age of Onset    Heart disease Mother     Diabetes Mellitus Mother     Lupus Mother     Rheum arthritis Mother     Hypertension Mother     Thyroid disease Mother     Diabetes Mellitus  Father     Heart disease Father     Hypertension Father     Kidney disease Father      Social History     Tobacco Use    Smoking status: Former    Smokeless tobacco: Never   Substance Use Topics    Alcohol use: Yes     Comment: socially     Review of Systems   Constitutional:  Negative for chills and fever.   HENT:  Negative for congestion and sore throat.    Eyes:  Positive for pain, discharge, redness and itching. Negative for photophobia and visual disturbance.   Respiratory:  Positive for cough, chest tightness and wheezing. Negative for shortness of breath.    Cardiovascular:  Negative for chest pain, palpitations and leg swelling.   Gastrointestinal:  Negative for abdominal pain, diarrhea, nausea and vomiting.   Genitourinary:  Negative for dysuria.   Musculoskeletal:  Negative for back pain, neck pain and neck stiffness.   Neurological:  Negative for dizziness, light-headedness and headaches.     Physical Exam     Initial Vitals [05/06/23 0848]   BP Pulse Resp Temp SpO2   130/67 66 20 98 °F (36.7 °C) 96 %      MAP       --         Physical Exam    Nursing note and vitals reviewed.  Constitutional: Vital signs are normal. She appears well-developed and well-nourished. She is cooperative. She does not have a sickly appearance. She does not appear ill. No distress.   HENT:   Head: Normocephalic and atraumatic.   Right Ear: Tympanic membrane and ear canal normal.   Left Ear: Tympanic membrane and ear canal normal.   Nose: Nose normal.   Mouth/Throat: Uvula is midline and oropharynx is clear and moist.   Eyes: EOM are normal.   Bilateral eye conjunctiva very mildly injected.  Clear drainage.  No visible crusting on eyelids.  Small subconjunctival hemorrhage to left inferior eye.  No surrounding eye tenderness, erythema or swelling.  No pain with EOM. PERRL.  Further evaluation with fluorescent staining and Wood's lamp revealing no corneal injuries, abrasions or ulcerations.  No visible foreign body.  Right IOP 18,  17, 18.  Left IOP 16, 17, 18   Neck: Neck supple.   Normal range of motion.  Cardiovascular:  Normal rate, regular rhythm and normal heart sounds.           Pulmonary/Chest:   Speaking in full sentences with no labored breathing and no signs of respiratory distress.  Mild wheezing bilaterally   Abdominal: Abdomen is soft. There is no abdominal tenderness.   Musculoskeletal:         General: No tenderness.      Cervical back: Normal range of motion and neck supple. No rigidity.     Neurological: She is alert and oriented to person, place, and time. She is not disoriented. GCS eye subscore is 4. GCS verbal subscore is 5. GCS motor subscore is 6.   Skin: Skin is warm and dry.   Psychiatric: She has a normal mood and affect. Her speech is normal and behavior is normal. Thought content normal.       ED Course   Procedures  Labs Reviewed   COMPREHENSIVE METABOLIC PANEL - Abnormal; Notable for the following components:       Result Value    Glucose 116 (*)     eGFR 48 (*)     All other components within normal limits   INFLUENZA A & B BY MOLECULAR   CBC W/ AUTO DIFFERENTIAL   LIPASE   TROPONIN I   SARS-COV-2 RNA AMPLIFICATION, QUAL          Imaging Results              X-Ray Chest PA And Lateral (Final result)  Result time 05/06/23 11:21:43      Final result by Austyn Diamond MD (05/06/23 11:21:43)                   Impression:      No focal consolidation identified.      Electronically signed by: Austyn Diamond MD  Date:    05/06/2023  Time:    11:21               Narrative:    EXAMINATION:  XR CHEST PA AND LATERAL    CLINICAL HISTORY:  Cough, unspecified    TECHNIQUE:  PA and lateral views of the chest were performed.    COMPARISON:  08/07/2015    FINDINGS:  Insulin pump projects over the left chest wall.    The cardiomediastinal silhouette is normal in size and midline. Pulmonary vascularity appears within normal limits.    The lungs appear clear without confluent pulmonary parenchymal opacity. No pleural  fluid.    Osseous structures appear intact.                                       Medications   fluorescein ophthalmic strip 1 each (has no administration in time range)   proparacaine 0.5 % ophthalmic solution 2 drop (has no administration in time range)   albuterol-ipratropium 2.5 mg-0.5 mg/3 mL nebulizer solution 3 mL (3 mLs Nebulization Given 5/6/23 1244)   predniSONE tablet 60 mg (60 mg Oral Given 5/6/23 1228)     Medical Decision Making:   Initial Assessment:   Patient presents with multiple complaints including bilateral eye redness, itching and drainage along with cough and wheezing.  Afebrile arrival with vitals WNL.  O2 sat 96% on room air.  Patient otherwise resting comfortably and in no apparent distress.  Bilateral conjunctiva mildly injected.  Small subconjunctival hemorrhage to left inferior eye.  No visible foreign body.  Mild wheezing into bilateral lung fields.  Differential Diagnosis:   Including but not limited to Conjunctivitis bacterial/viral/allergy, URI, COVID, influenza, pneumonia, bronchitis, asthma  ED Management:  CBC, CMP, COVID, influenza, EKG, CXR, troponin    No visible foreign bodies or corneal injury noted on exam.  Normal IOP.  Will give prescription for topical erythromycin, encouraging close Ophthalmology follow-up.    COVID and flu tests are negative.  CBC grossly unremarkable with no elevation in WBC.  CMP grossly unremarkable.  No significant electrolyte abnormalities.  EKG sinus Andrey, rate 58, no acute ST elevation.  EKG reviewed by attending, Dr. Schofield.  Troponin WNL.  CXR with no acute cardiopulmonary findings.    Patient given prednisone and breathing treatment in ED. with reported significant improvement of her symptoms.  She states she is eager to return home.  Will plan to continue with conservative care.  Prescription for prednisone and albuterol inhaler.  Patient instructed to monitor her glucose very closely while on prednisone.  Also encouraged close PCP  follow-up with high ED return precautions.  Patient states her understanding and agrees with plan.                        Clinical Impression:   Final diagnoses:  [R05.9] Cough  [J45.909] Asthma, unspecified asthma severity, unspecified whether complicated, unspecified whether persistent (Primary)  [H10.33] Acute conjunctivitis of both eyes, unspecified acute conjunctivitis type  [H10.30] Acute conjunctivitis, unspecified acute conjunctivitis type, unspecified laterality        ED Disposition Condition    Discharge Stable          ED Prescriptions       Medication Sig Dispense Start Date End Date Auth. Provider    erythromycin (ROMYCIN) ophthalmic ointment Place a 1/2 inch ribbon of ointment into the lower eyelid. 3.5 g 5/6/2023 -- Enmanuel Richard PA-C    predniSONE (DELTASONE) 20 MG tablet Take 1 tablet (20 mg total) by mouth once daily. for 3 days 3 tablet 5/6/2023 5/9/2023 Enmanuel Richard PA-C    albuterol (PROVENTIL/VENTOLIN HFA) 90 mcg/actuation inhaler Inhale 1-2 puffs into the lungs every 6 (six) hours as needed for Wheezing. Rescue 8 g 5/6/2023 5/5/2024 Enmanuel Richard PA-C          Follow-up Information       Follow up With Specialties Details Why Contact Info    Tc Trinidad MD Internal Medicine   [address] [address]  [phone]      ophthamology    [phone]             Enmanuel Richard PA-C  05/06/23 4074

## 2023-05-06 NOTE — DISCHARGE INSTRUCTIONS

## 2023-05-06 NOTE — ED NOTES
Patient presents to ED with c/o pain and redness to bilateral eyes and congested cough with yellow sputum since Tuesday. Patient states she has a hx of bronchitis and has been around sick contacts with pink eye. Patient states she was prescribed a z-pack and eye drops which she finished, but states she does not feel any better. Patient requested to have eyes, ears, and chest examined. Patient AAOx4 and ambulatory.